# Patient Record
Sex: FEMALE | Race: WHITE
[De-identification: names, ages, dates, MRNs, and addresses within clinical notes are randomized per-mention and may not be internally consistent; named-entity substitution may affect disease eponyms.]

---

## 2018-09-18 ENCOUNTER — HOSPITAL ENCOUNTER (OUTPATIENT)
Dept: HOSPITAL 25 - OR | Age: 43
Discharge: HOME | End: 2018-09-18
Attending: ORTHOPAEDIC SURGERY
Payer: COMMERCIAL

## 2018-09-18 VITALS — SYSTOLIC BLOOD PRESSURE: 138 MMHG | DIASTOLIC BLOOD PRESSURE: 83 MMHG

## 2018-09-18 DIAGNOSIS — M75.21: ICD-10-CM

## 2018-09-18 DIAGNOSIS — G89.18: ICD-10-CM

## 2018-09-18 DIAGNOSIS — J30.2: ICD-10-CM
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
## 2018-09-18 DIAGNOSIS — S43.491A: ICD-10-CM

## 2018-09-18 DIAGNOSIS — S46.011A: Primary | ICD-10-CM

## 2018-09-18 PROCEDURE — C1713 ANCHOR/SCREW BN/BN,TIS/BN: HCPCS

## 2018-09-18 PROCEDURE — C1776 JOINT DEVICE (IMPLANTABLE): HCPCS

## 2018-09-19 NOTE — OP
CC:  PCP, Nicole Yusuf MD *

 

DATE OF OPERATION:  09/18/18 - [facility]

 

DATE OF BIRTH:  [birth year]

 

SURGEON:  Viki Harrington MD.

 

ASSISTANT:  CAROL Chanel.  An assistant was needed for the entirety of 
the case to help with positioning, retraction, and was utilized throughout all 
portions of the case.

 

ANESTHESIOLOGIST:  Dr. Floyd.

 

ANESTHESIA:  General interscalene block.

 

PRE-OP DIAGNOSES:  Right shoulder high-grade, partial-thickness tear of the 
rotator cuff with bicipital tendinitis and SLAP tear.

 

POST-OP DIAGNOSES:  Right shoulder high-grade, partial-thickness tear of the 
rotator cuff with bicipital tendinitis and SLAP tear.

 

OPERATIVE PROCEDURE:

1.  Right shoulder arthroscopy with extensive glenohumeral debridement.

2.  Revision decompression and debridement.

3.  Revision rotator cuff repair of the supraspinatus in a double-row fashion.

4.  Open subpectoral biceps tenodesis.

 

INDICATIONS:  The patient is a 42-year-old female who has had persistent 
shoulder pain for a number of years.  Approximately 17 years ago she had 
surgery by Dr. Dent, which is a right shoulder rotator cuff repair, 
decompression, and debridement.  She never quite did well, was unable to get 
back to sports such as volleyball and always had issues with throwing and 
overhead activities.  She continued to persist; she did not want surgical 
treatment.  Risks and benefits were discussed at length included, but not 
limited to, bleeding; infection; damage to nerves, vessels, surrounding 
structures; wound nonhealing; persistent pain; need for further surgery; 
scarring; stiffness; incomplete relief of symptoms; risk of anesthesia.

 

COMPLICATIONS:  None.

 

ESTIMATED BLOOD LOSS:  Minimal.

 

IMPLANTS USED:  One 4.75 Healicoil with 2 MultiFIX and one 2.8-mm Q-Fix anchor.

 

DESCRIPTION OF PROCEDURE:  The patient was greeted in the preoperative area by 
the attending surgeon.  Correct extremity was marked and consent was confirmed.
  The patient underwent interscalene nerve block by the anesthesiologist.  She 
was then brought back to the operating suite, where she was placed in supine 
position on the operating table.  She then underwent general anesthesia and 
endotracheal intubation, after which she was placed in left lateral decubitus 
position with an axillary roll.  All bony prominences were padded.  She was 
secured with a pegboard. The right shoulder was draped unsterile with 10 pounds 
of traction.  The right shoulder was prepped and draped in the usual sterile 
fashion beginning with chlorhexidine soap, scrub, and alcohol wipe, and a final 
prep with ChloraPrep.

 

After appropriate surgical pause indicating site, side, procedure, and 
administration of antibiotics, a standard postero-lateral portal was made 
sharply with 11 blade.  The scope was introduced to the joint.  Joint was 
examined.  There were minimal glenohumeral changes with grade 0 to 1 changes.  
The anterior, posterior, and superior labrum had mild fraying.  The superior 
labrum had a type 2 SLAP tear with synovitis and the entire joint was 
erythematous and inflamed.  The inferior recess was intact.  There were no 
loose bodies present.  The undersurface of the rotator cuff had unstable 
tearing with a moderate-sized flap.  At this point, the biceps was tenotomized 
for later tenodesis after the anterior portal was made and a shaver was used to 
debride back the anterior, posterior, superior labrum.  The subscap was 
identified and found to be intact.  The undersurface of the supraspinatus again 
had high-grade, partial-thickness tearing.  This was then marked with an 0 PDS 
suture and was found to have very little resistance to the needle which is due 
to the face that there was actually a full-thickness tear.  There were no 
obvious sutures that were apparent to be removed.  The attention was then 
directed to subacromial space.

 

With the scope in the subacromial space, the shaver was used to remove the 
bursa that was present.  There was evidence of an irregular anterolateral spur, 
which was debrided back using a 4-0 oval bur. All of this debris was removed 
from this portion of the case.  Attention was then directed to the rotator cuff
, which was gently probed.  It was found to be thin and an area where it had 
been previously marked had significant high-grade, partial-thickness tear.  At 
this point, decision was made to take down the tendon as this was probably more 
than 75% to 100% of the tendon.  The shaver was used to debride back the tendon 
until healthy tissue was identified.  The greater tuberosity was skeletonized 
using electrocautery device. The rasp and the 4-0 oval bur were then used to 
gently decorticate.  The patient had excellent quality bone and the cuff was 
then mobilized to be approximated.  The greater tuberosity was then prepared 
and through a separate stab incision, a 4.75 Healicoil was placed with 
excellent purchase.  Again, the bone quality was excellent.  The starting awl 
was then used to make microfractures for greater chance of healing.  The 
sutures were then passed through in a horizontal mattress configuration and 
then tied down using arthroscopic knot tying technique.  There was a small dog 
ear, so a free suture was placed through the dog ear, to later be 
reapproximated.  At this point, the suture ends were split and passed through 2 
separate MultiFIX, one was anterior, one was posterior, and these were placed 
with excellent purchase to allow for a double-row fixation.  The final images 
were obtained.  The shoulder was taken through range of motion.  The wounds 
were copiously irrigated.  Attention was directed to the biceps.

 

The bed was air planed to the right side.  The anterior aspect of the shoulder 
was prepped again using ChloraPrep.  A 15 blade was used to make an incision in 
line with the biceps tendon. The soft tissues were carefully dissected to 
expose the intact fascia.  Once identified, the remainder of the resection was 
done bluntly. The biceps was then brought through the wound and extended.  It 
was found to have abundant synovitis and erythema.  The groove was repaired in 
the usual fashion with electrocautery, the red ball rasp, and osteotome to 
allow for a bony bleeding bed. The Q-Fix anchor was drilled unicortically.  The 
Q-Fix afforded excellent purchase. Sutures were then passed in Sandoval-Jason type 
configuration and the excess stump was excised.  Biceps was shuttled back into 
the wound.  Wounds were copiously irrigated.  Portals were closed with 3-0 
nylon.  The anterior wound was closed in layers with 2-0 Vicryl and 3-0 
Monocryl.  Sterile dressings were applied.  A Cryo/Cuff and UltraSling were 
applied.  She was awoken from anesthesia and transferred to the PACU in stable 
condition. 



POSTOPERATIVE PLAN:  She will be nonweightbearing.  She will be in a sling for 
6 weeks.  She will be discharged on pain medication, antibiotics.  I will see 
the patient back in 10 to 14 days.  DVT prophylaxis considered but deferred due 
to no previous personal or family history.

 

 [id number] #: [id number]

[facility] Lang[facility] Health SystemDARIO